# Patient Record
Sex: MALE | Race: WHITE | NOT HISPANIC OR LATINO | Employment: OTHER | URBAN - METROPOLITAN AREA
[De-identification: names, ages, dates, MRNs, and addresses within clinical notes are randomized per-mention and may not be internally consistent; named-entity substitution may affect disease eponyms.]

---

## 2018-04-27 PROBLEM — Z12.11 COLON CANCER SCREENING: Status: RESOLVED | Noted: 2018-04-27 | Resolved: 2018-04-27

## 2018-04-27 PROBLEM — Z12.11 COLON CANCER SCREENING: Status: ACTIVE | Noted: 2018-04-27

## 2023-11-10 ENCOUNTER — HOSPITAL ENCOUNTER (EMERGENCY)
Facility: HOSPITAL | Age: 63
Discharge: HOME | End: 2023-11-10
Attending: EMERGENCY MEDICINE

## 2023-11-10 VITALS
WEIGHT: 187 LBS | SYSTOLIC BLOOD PRESSURE: 132 MMHG | HEART RATE: 96 BPM | RESPIRATION RATE: 18 BRPM | TEMPERATURE: 98 F | DIASTOLIC BLOOD PRESSURE: 77 MMHG | BODY MASS INDEX: 27.62 KG/M2 | OXYGEN SATURATION: 95 %

## 2023-11-10 DIAGNOSIS — S61.512A LACERATION OF LEFT WRIST: ICD-10-CM

## 2023-11-10 PROCEDURE — 99283 EMERGENCY DEPT VISIT LOW MDM: CPT | Mod: 25

## 2023-11-10 PROCEDURE — 25000003 PHARM REV CODE 250

## 2023-11-10 PROCEDURE — 12002 RPR S/N/AX/GEN/TRNK2.6-7.5CM: CPT

## 2023-11-10 RX ORDER — LIDOCAINE HYDROCHLORIDE 10 MG/ML
10 INJECTION, SOLUTION EPIDURAL; INFILTRATION; INTRACAUDAL; PERINEURAL
Status: COMPLETED | OUTPATIENT
Start: 2023-11-10 | End: 2023-11-10

## 2023-11-10 RX ADMIN — LIDOCAINE HYDROCHLORIDE 100 MG: 10 INJECTION, SOLUTION EPIDURAL; INFILTRATION; INTRACAUDAL at 12:11

## 2023-11-10 NOTE — ED TRIAGE NOTES
Sustained laceration to left forearm with miter saw just PTA. Presents with dirty dressing around wound. Full distal sensation and movement noted. No distress.

## 2023-11-10 NOTE — DISCHARGE INSTRUCTIONS
You have 5 stitches   Keep sutures/staples and wound clean and dry.  Avoid submersion underwater; use soap, clean water, and gentle scrubbing to clean area.  Apply a new sterile bandage when existing bandage becomes dirty or saturated.  Monitor the wound regularly for any evidence of infection, including redness, drainage, increasing pain, or fever.   Follow-up with your PCP or return to ER as directed for wound re-check and suture/staple removal in 7-10 days.

## 2023-11-10 NOTE — ED PROVIDER NOTES
Encounter Date: 11/10/2023       History     Chief Complaint   Patient presents with    Laceration     Pt cut left forearm using micro-saw. Bleeding controlled in triage. Up to date on tetanus.      64 yo male with PMHx chronic back pain, GERD, Hep C presents to the ED with radiation to dorsal left wrist.  He reports he was cutting wood with a micro saw when the blade slipped.  He denies any numbness or tingling.  He is FROM about his hand wrist fingers.  He reports his tetanus did earlier this year.  He did not clean the wound after injury, but applied a pressure dressing. No DM.    The history is provided by the patient.     Review of patient's allergies indicates:  No Known Allergies  Past Medical History:   Diagnosis Date    Chronic back pain     GERD (gastroesophageal reflux disease)     Hepatitis C      Past Surgical History:   Procedure Laterality Date    ABDOMINAL ADHESION SURGERY      APPENDECTOMY      COLONOSCOPY N/A 4/27/2018    Procedure: COLONOSCOPY WITH POLYPECTOMY;  Surgeon: Duane Navarro MD;  Location: [facility];  Service: Endoscopy;  Laterality: N/A;     No family history on file.  Social History     Tobacco Use    Smoking status: Every Day     Current packs/day: 1.00     Types: Cigarettes   Substance Use Topics    Alcohol use: No    Drug use: No     Review of Systems   Constitutional:  Negative for chills and fever.   Musculoskeletal:  Negative for joint swelling.   Skin:  Positive for wound.   Psychiatric/Behavioral:  Negative for agitation and confusion.        Physical Exam     Initial Vitals [11/10/23 1129]   BP Pulse Resp Temp SpO2   132/77 96 18 98.1 °F (36.7 °C) 95 %      MAP       --         Physical Exam    Nursing note and vitals reviewed.  Constitutional: He appears well-developed and well-nourished. He is not diaphoretic.  Non-toxic appearance. No distress.   HENT:   Head: Normocephalic and atraumatic.   Right Ear: External ear normal.   Left Ear: External ear normal.   Eyes: EOM are  normal.   Neck: Neck supple.   Normal range of motion.  Cardiovascular:  Normal rate.           Pulmonary/Chest: No respiratory distress.   Abdominal: He exhibits no distension.   Musculoskeletal:         General: Normal range of motion.      Cervical back: Normal range of motion and neck supple.      Comments: 4 cm linear laceration to dorsal aspect of left wrist, some disruption of fascia. .5 cm width. No visible tendon, vascular, nerve injury.  Wound explored through full range of motion.  No visible contamination.  Patient has full range of motion about hand and wrist.  Sensation intact throughout.  Brisk capillary refill.     Neurological: He is alert and oriented to person, place, and time. GCS score is 15. GCS eye subscore is 4. GCS verbal subscore is 5. GCS motor subscore is 6.   Skin: Skin is dry.   Psychiatric: He has a normal mood and affect. His behavior is normal. Judgment and thought content normal.         ED Course   Lac Repair    Date/Time: 11/10/2023 2:33 PM    Performed by: Linda Simmons PA-C  Authorized by: Britany Ernst MD    Consent:     Consent obtained:  Verbal    Consent given by:  Patient  Universal protocol:     Patient identity confirmed:  Verbally with patient  Anesthesia:     Anesthesia method:  Local infiltration    Local anesthetic:  Lidocaine 1% w/o epi  Laceration details:     Location:  Shoulder/arm    Shoulder/arm location:  L lower arm    Length (cm):  4  Pre-procedure details:     Preparation:  Patient was prepped and draped in usual sterile fashion  Exploration:     Wound exploration: wound explored through full range of motion      Wound extent: areolar tissue violated and fascia violated      Wound extent: no foreign bodies/material noted, no muscle damage noted, no nerve damage noted, no tendon damage noted, no underlying fracture noted and no vascular damage noted    Treatment:     Area cleansed with:  Povidone-iodine    Amount of cleaning:  Standard     Irrigation solution:  Sterile saline and tap water  Skin repair:     Repair method:  Sutures    Suture size:  4-0    Suture material:  Nylon    Suture technique:  Simple interrupted    Number of sutures:  5  Repair type:     Repair type:  Simple  Post-procedure details:     Dressing:  Non-adherent dressing    Procedure completion:  Tolerated well, no immediate complications    Labs Reviewed - No data to display       Imaging Results              X-Ray Wrist Complete Left (Final result)  Result time 11/10/23 12:55:02      Final result by Redd Marc DO (11/10/23 12:55:02)                   Impression:      Soft tissue laceration as above.  No acute fracture or dislocation or radiopaque foreign body.      Electronically signed by: Redd Marc  Date:    11/10/2023  Time:    12:55               Narrative:    EXAMINATION:  XR WRIST COMPLETE 3 VIEWS LEFT    CLINICAL HISTORY:  Laceration without foreign body of left wrist, initial encounter    TECHNIQUE:  PA, lateral, and oblique views of the left wrist were performed.    COMPARISON:  None    FINDINGS:  There is a soft tissue laceration of the dorsal aspect of the wrist or distal forearm.  There is no evidence of a radiopaque foreign body in the field of view.  There is no acute fracture or dislocation.  There are nonspecific cysts in the ulnar styloid process.  There is a small well corticated osseous density just proximal to the distal radioulnar joint.  There is joint space narrowing of the triscaphe joint and the base of the thumb.                                       Medications   LIDOcaine (PF) 10 mg/ml (1%) injection 100 mg (100 mg Infiltration Given 11/10/23 1215)     Medical Decision Making  62 yo male with laceration to dorsal left wrist. 4 cm linear. NVI. No visible tendon injury. FROM. XR ordered to r/o metallic foreign body. No visible contamination.     Laceration, abrasion, nerve injury, vascular injury, tendon injury, fracture, open fracture    4  cm linear laceration sustained prior to arrival to dorsal left wrist. Tetanus UTD. Wound copiously irrigated and cleansed.  Entire depth of wound visualized and is without visible tendon, nerve, vascular injury.  X-ray without evidence of radiopaque foreign body or underlying fracture.  Skin prepped with iodine, laceration repair performed without complication. 5 sutures placed. Keep sutures/staples and wound clean and dry. Avoid submersion underwater; use soap, clean water, and gentle scrubbing to clean area. Apply a new sterile bandage when existing bandage becomes dirty or saturated. Monitor the wound regularly for any evidence of infection, including redness, drainage, increasing pain, or fever. Follow-up with your PCP or return to ER as directed for wound re-check and suture/staple removal in 7-10 days.      Amount and/or Complexity of Data Reviewed  Radiology: ordered. Decision-making details documented in ED Course.    Risk  Prescription drug management.               ED Course as of 11/10/23 1434   Fri Nov 10, 2023   1203 Patient reports tetanus UTD [CS]   1257 X-Ray Wrist Complete Left  Soft tissue laceration as above.  No acute fracture or dislocation or radiopaque foreign body. [CS]      ED Course User Index  [CS] Linda Simmons PA-C                    Clinical Impression:   Final diagnoses:  [S61.512A] Laceration of left wrist        ED Disposition Condition    Discharge Stable          ED Prescriptions    None       Follow-up Information       Follow up With Specialties Details Why Contact Info    Primary Care Provider  Schedule an appointment as soon as possible for a visit in 10 days               Linda Simmons PA-C  11/10/23 1434

## 2024-06-11 ENCOUNTER — HOSPITAL ENCOUNTER (EMERGENCY)
Facility: HOSPITAL | Age: 64
Discharge: HOME | End: 2024-06-11
Attending: EMERGENCY MEDICINE
Payer: COMMERCIAL

## 2024-06-11 VITALS
OXYGEN SATURATION: 100 % | RESPIRATION RATE: 18 BRPM | SYSTOLIC BLOOD PRESSURE: 153 MMHG | BODY MASS INDEX: 27.7 KG/M2 | DIASTOLIC BLOOD PRESSURE: 84 MMHG | TEMPERATURE: 99 F | HEIGHT: 69 IN | HEART RATE: 70 BPM | WEIGHT: 187 LBS

## 2024-06-11 DIAGNOSIS — M54.50 ACUTE RIGHT-SIDED LOW BACK PAIN, UNSPECIFIED WHETHER SCIATICA PRESENT: Primary | ICD-10-CM

## 2024-06-11 LAB
BILIRUB UR QL STRIP: NEGATIVE
CLARITY UR: CLEAR
COLOR UR: YELLOW
GLUCOSE UR QL STRIP: NEGATIVE
HGB UR QL STRIP: NEGATIVE
KETONES UR QL STRIP: NEGATIVE
LEUKOCYTE ESTERASE UR QL STRIP: NEGATIVE
NITRITE UR QL STRIP: NEGATIVE
PH UR STRIP: 7 [PH] (ref 5–8)
PROT UR QL STRIP: NEGATIVE
SP GR UR STRIP: 1.03 (ref 1–1.03)
URN SPEC COLLECT METH UR: ABNORMAL
UROBILINOGEN UR STRIP-ACNC: ABNORMAL EU/DL

## 2024-06-11 PROCEDURE — 63600175 PHARM REV CODE 636 W HCPCS: Performed by: PHYSICIAN ASSISTANT

## 2024-06-11 PROCEDURE — 81003 URINALYSIS AUTO W/O SCOPE: CPT | Performed by: PHYSICIAN ASSISTANT

## 2024-06-11 PROCEDURE — 99284 EMERGENCY DEPT VISIT MOD MDM: CPT | Mod: 25

## 2024-06-11 PROCEDURE — 96372 THER/PROPH/DIAG INJ SC/IM: CPT | Performed by: PHYSICIAN ASSISTANT

## 2024-06-11 RX ORDER — METHOCARBAMOL 500 MG/1
500 TABLET, FILM COATED ORAL 3 TIMES DAILY
Qty: 15 TABLET | Refills: 0 | Status: SHIPPED | OUTPATIENT
Start: 2024-06-11 | End: 2024-06-16

## 2024-06-11 RX ORDER — NAPROXEN 500 MG/1
500 TABLET ORAL 2 TIMES DAILY WITH MEALS
Qty: 30 TABLET | Refills: 0 | Status: SHIPPED | OUTPATIENT
Start: 2024-06-11

## 2024-06-11 RX ORDER — KETOROLAC TROMETHAMINE 30 MG/ML
15 INJECTION, SOLUTION INTRAMUSCULAR; INTRAVENOUS
Status: COMPLETED | OUTPATIENT
Start: 2024-06-11 | End: 2024-06-11

## 2024-06-11 RX ORDER — LIDOCAINE 50 MG/G
1 PATCH TOPICAL DAILY
Qty: 5 PATCH | Refills: 0 | Status: SHIPPED | OUTPATIENT
Start: 2024-06-11

## 2024-06-11 RX ORDER — DEXAMETHASONE SODIUM PHOSPHATE 4 MG/ML
8 INJECTION, SOLUTION INTRA-ARTICULAR; INTRALESIONAL; INTRAMUSCULAR; INTRAVENOUS; SOFT TISSUE
Status: COMPLETED | OUTPATIENT
Start: 2024-06-11 | End: 2024-06-11

## 2024-06-11 RX ADMIN — KETOROLAC TROMETHAMINE 15 MG: 30 INJECTION, SOLUTION INTRAMUSCULAR; INTRAVENOUS at 03:06

## 2024-06-11 RX ADMIN — DEXAMETHASONE SODIUM PHOSPHATE 8 MG: 4 INJECTION, SOLUTION INTRA-ARTICULAR; INTRALESIONAL; INTRAMUSCULAR; INTRAVENOUS; SOFT TISSUE at 03:06

## 2024-06-11 NOTE — ED PROVIDER NOTES
Encounter Date: 6/11/2024       History     Chief Complaint   Patient presents with    Back Pain     Lower back pain x 1 week. No reports of trauma. States pain on right side of back with pain shooting down left leg.     63-year-old male, PMH chronic back pain, presents to ED with concern of exacerbation of his lower back pain roughly 1 week ago.  Denies any specific injury or trauma.  Pain symptoms do worse with touch and movement are predominantly on right side but with intermittent pain radiating into left lower extremity.  No urinary or bowel incontinence, changes in urine color or frequency but he does mention mild burning with urination 2-3 days ago that has since resolved.  No abdominal or flank pain, fevers, chills, nausea or vomiting.  No numbness or focal weakness.  No other acute complaints at this time.    The history is provided by the patient.     Review of patient's allergies indicates:  No Known Allergies  Past Medical History:   Diagnosis Date    Chronic back pain     GERD (gastroesophageal reflux disease)     Hepatitis C      Past Surgical History:   Procedure Laterality Date    ABDOMINAL ADHESION SURGERY      APPENDECTOMY      COLONOSCOPY N/A 4/27/2018    Procedure: COLONOSCOPY WITH POLYPECTOMY;  Surgeon: Duane Navarro MD;  Location: [facility];  Service: Endoscopy;  Laterality: N/A;     No family history on file.  Social History     Tobacco Use    Smoking status: Every Day     Current packs/day: 1.00     Types: Cigarettes   Substance Use Topics    Alcohol use: No    Drug use: No     Review of Systems   Constitutional:  Negative for chills and fever.   Gastrointestinal:  Negative for abdominal pain, diarrhea, nausea and vomiting.   Genitourinary:  Positive for dysuria (resolved).   Musculoskeletal:  Positive for back pain. Negative for neck pain and neck stiffness.   Neurological:  Negative for weakness and numbness.       Physical Exam     Initial Vitals [06/11/24 1328]   BP Pulse Resp Temp SpO2    (!) 153/84 73 18 98.3 °F (36.8 °C) 100 %      MAP       --         Physical Exam    Vitals reviewed.  Constitutional: He appears well-developed and well-nourished. He is active. He does not have a sickly appearance. He does not appear ill. No distress.   HENT:   Head: Normocephalic and atraumatic.   Neck:   Normal range of motion.  Musculoskeletal:      Cervical back: Normal range of motion.      Comments: Reproducible tenderness to right-sided lower lumbar paraspinal region.  No midline bony tenderness with no bony palpable step-offs.  Also noted have tenderness near right SI joint.  Appropriate sensation and strength into BLE.  Positive straight leg raise on right.  Ambulatory in ED without assistance     Neurological: He is alert. GCS eye subscore is 4. GCS verbal subscore is 5. GCS motor subscore is 6.   Skin: Skin is warm and dry.   Psychiatric: He has a normal mood and affect. His speech is normal and behavior is normal.         ED Course   Procedures  Labs Reviewed   URINALYSIS, REFLEX TO URINE CULTURE - Abnormal; Notable for the following components:       Result Value    Urobilinogen, UA 4.0-6.0 (*)     All other components within normal limits    Narrative:     Specimen Source->Urine          Imaging Results    None          Medications   ketorolac injection 15 mg (15 mg Intramuscular Given 6/11/24 1516)   dexAMETHasone injection 8 mg (8 mg Intramuscular Given 6/11/24 1515)     Medical Decision Making  Patient presents with concern of exacerbation of his chronic lower back pain that began 1 week ago.  No associated injury or trauma.  No urinary or bowel incontinence.  Afebrile.  Reproducible tenderness to right-sided lower lumbar paraspinal region.  No neurological deficits noted.    DDx:  Including but not limited to strain, sprain, spasm, radiculopathy, neuropathy, arthritis, DDD, less likely cauda equina    Amount and/or Complexity of Data Reviewed  Labs:  Decision-making details documented in ED  Course.    Risk  Prescription drug management.               ED Course as of 06/11/24 1551   Tue Jun 11, 2024   1529 Urinalysis, Reflex to Urine Culture Urine, Clean Catch(!)  Unremarkable.  No hematuria. [KS]   1548 Patient was reassessed, reporting significant improvement of his symptoms following ED intervention.  Will continue with supportive care for suspected musculoskeletal pain.  Prescriptions as written below.  Encouraged ice/heat, stretches and movements as tolerated, outpatient follow-up.  ED return precautions were discussed.  Patient states his understanding and agrees with plan. [KS]      ED Course User Index  [KS] Fuentes Talbert PA-C                           Clinical Impression:  Final diagnoses:  [M54.50] Acute right-sided low back pain, unspecified whether sciatica present (Primary)          ED Disposition Condition    Discharge Stable          ED Prescriptions       Medication Sig Dispense Start Date End Date Auth. Provider    methocarbamoL (ROBAXIN) 500 MG Tab Take 1 tablet (500 mg total) by mouth 3 (three) times daily. for 5 days 15 tablet 6/11/2024 6/16/2024 Fuentes Talbert PA-C    LIDOcaine (LIDODERM) 5 % Place 1 patch onto the skin once daily. Remove & Discard patch within 12 hours or as directed by MD 5 patch 6/11/2024 -- Fuentes Talbert PA-C    naproxen (NAPROSYN) 500 MG tablet Take 1 tablet (500 mg total) by mouth 2 (two) times daily with meals. 30 tablet 6/11/2024 -- Fuentes Talbert PA-C          Follow-up Information       Follow up With Specialties Details Why Contact Info    Gabino Collins MD Family Medicine   [address]  [place] [id number] [phone]               Fuentes Talbert PA-C  06/11/24 7951

## 2024-06-11 NOTE — ED TRIAGE NOTES
Reports right flank pain with radiation down left leg x 1 week. States h/o sciatica with JACIEL in past. States pain is worse when laying flat, unable to rest at night. Adds that he is also having dysuria. No fever. No N/V. Taking BC powder with minimal relief. Presents awake, alert.

## 2024-06-11 NOTE — ED NOTES
Pt presents to ED with C/O lower back pain with onset x1 week. Pt states the pain radiated down the L leg. Pt states the pain is worse at night. Pt also C/P pain and burning with urination. Pt states he has been taking BC powder with no relief.

## 2024-06-11 NOTE — DISCHARGE INSTRUCTIONS

## 2025-02-06 ENCOUNTER — HOSPITAL ENCOUNTER (EMERGENCY)
Facility: HOSPITAL | Age: 65
Discharge: HOME | End: 2025-02-06
Attending: EMERGENCY MEDICINE
Payer: COMMERCIAL

## 2025-02-06 VITALS
RESPIRATION RATE: 18 BRPM | HEART RATE: 75 BPM | BODY MASS INDEX: 28.14 KG/M2 | SYSTOLIC BLOOD PRESSURE: 145 MMHG | TEMPERATURE: 98 F | HEIGHT: 69 IN | DIASTOLIC BLOOD PRESSURE: 82 MMHG | OXYGEN SATURATION: 96 % | WEIGHT: 190 LBS

## 2025-02-06 DIAGNOSIS — M10.9 ACUTE GOUT OF LEFT KNEE, UNSPECIFIED CAUSE: Primary | ICD-10-CM

## 2025-02-06 PROCEDURE — 63600175 PHARM REV CODE 636 W HCPCS: Mod: JZ,TB

## 2025-02-06 PROCEDURE — 99284 EMERGENCY DEPT VISIT MOD MDM: CPT | Mod: 25

## 2025-02-06 PROCEDURE — 96372 THER/PROPH/DIAG INJ SC/IM: CPT

## 2025-02-06 RX ORDER — INDOMETHACIN 50 MG/1
50 CAPSULE ORAL 3 TIMES DAILY
Qty: 20 CAPSULE | Refills: 0 | Status: SHIPPED | OUTPATIENT
Start: 2025-02-06

## 2025-02-06 RX ORDER — COLCHICINE 0.6 MG/1
0.6 CAPSULE ORAL DAILY
Qty: 8 CAPSULE | Refills: 0 | Status: SHIPPED | OUTPATIENT
Start: 2025-02-06 | End: 2025-02-14

## 2025-02-06 RX ORDER — KETOROLAC TROMETHAMINE 30 MG/ML
30 INJECTION, SOLUTION INTRAMUSCULAR; INTRAVENOUS
Status: COMPLETED | OUTPATIENT
Start: 2025-02-06 | End: 2025-02-06

## 2025-02-06 RX ORDER — PREDNISONE 20 MG/1
60 TABLET ORAL
Status: COMPLETED | OUTPATIENT
Start: 2025-02-06 | End: 2025-02-06

## 2025-02-06 RX ORDER — PREDNISONE 20 MG/1
40 TABLET ORAL DAILY
Qty: 10 TABLET | Refills: 0 | Status: SHIPPED | OUTPATIENT
Start: 2025-02-06 | End: 2025-02-11

## 2025-02-06 RX ADMIN — PREDNISONE 60 MG: 20 TABLET ORAL at 04:02

## 2025-02-06 RX ADMIN — KETOROLAC TROMETHAMINE 30 MG: 30 INJECTION, SOLUTION INTRAMUSCULAR; INTRAVENOUS at 04:02

## 2025-02-06 NOTE — DISCHARGE INSTRUCTIONS
Please take medications as prescribed to help with your gout flare.    Thank you for allowing me and my emergency team to take care of you here today! I hope you feel better soon. Please do not hesitate to return with any additional concerns that may arise from this or any new problem you encounter.    Our goal in the emergency department is to always give you outstanding care and exceptional service. If you receive a survey by mail or e-mail in the next week regarding your experience in our ED, we would greatly appreciate you completing it. Your feedback provides us with a way to recognize our staff who give very good care and it helps us learn how to improve when your experience was below the excellence we aspire to be!    Brook Juneau, PA-C [facility]   Emergency Room Physician Assistant

## 2025-02-06 NOTE — FIRST PROVIDER EVALUATION
Emergency Department TeleTriage Encounter Note      CHIEF COMPLAINT    Chief Complaint   Patient presents with    Knee Pain     Nontraumatic left knee pain that started ~Tuesday. Hx gout.        VITAL SIGNS   Initial Vitals [02/06/25 1453]   BP Pulse Resp Temp SpO2   (!) 145/82 75 18 98.4 °F (36.9 °C) 96 %      MAP       --            ALLERGIES    Review of patient's allergies indicates:  No Known Allergies    PROVIDER TRIAGE NOTE  Patient presents with pain in the left knee. No known injury. History of gout.       ORDERS  Labs Reviewed - No data to display    ED Orders (720h ago, onward)      None              Virtual Visit Note: The provider triage portion of this emergency department evaluation and documentation was performed via [employer], a HIPAA-compliant telemedicine application, in concert with a tele-presenter in the room. A face to face patient evaluation with one of my colleagues will occur once the patient is placed in an emergency department room.      DISCLAIMER: This note was prepared with [employer] voice recognition transcription software. Garbled syntax, mangled pronouns, and other bizarre constructions may be attributed to that software system.

## 2025-02-07 NOTE — ED PROVIDER NOTES
"Encounter Date: 2/6/2025       History     Chief Complaint   Patient presents with    Knee Pain     Nontraumatic left knee pain that started ~Tuesday. Hx gout.      Patient is a 64-year-old male with a past medical history of chronic back pain, GERD, and hepatitis-C who presents to emergency room for left knee pain.  Patient states that he has a history of gout.  Has not had a gout flare-up in "years." He started noticing pain around Tuesday.  Ate some shrimp yesterday, and pain worsened today.  No injury to the area.  Patient states that it is worse with bearing weight and flexion.  Denies weakness,, tingling, fever, body aches, chills, or others at this time.  No medications taken prior to arrival.    The history is provided by the patient. No  was used.     Review of patient's allergies indicates:  No Known Allergies  Past Medical History:   Diagnosis Date    Chronic back pain     GERD (gastroesophageal reflux disease)     Hepatitis C      Past Surgical History:   Procedure Laterality Date    ABDOMINAL ADHESION SURGERY      APPENDECTOMY      COLONOSCOPY N/A 4/27/2018    Procedure: COLONOSCOPY WITH POLYPECTOMY;  Surgeon: Duane Navarro MD;  Location: [facility];  Service: Endoscopy;  Laterality: N/A;     No family history on file.  Social History     Tobacco Use    Smoking status: Every Day     Current packs/day: 1.00     Types: Cigarettes   Substance Use Topics    Alcohol use: No    Drug use: No     Review of Systems   Constitutional:  Negative for chills, diaphoresis, fatigue and fever.   Musculoskeletal:  Positive for arthralgias (left knee). Negative for joint swelling and myalgias.   Skin:  Negative for color change, rash and wound.   Neurological:  Negative for weakness and numbness.       Physical Exam     Initial Vitals [02/06/25 1453]   BP Pulse Resp Temp SpO2   (!) 145/82 75 18 98.4 °F (36.9 °C) 96 %      MAP       --         Physical Exam    Nursing note and vitals " reviewed.  Constitutional: He appears well-developed and well-nourished. He is not diaphoretic. No distress.   Patient well-appearing.  Awake and alert.  No acute distress.  Maintaining airway appropriately.  Speaking in complete sentences.   HENT:   Head: Normocephalic and atraumatic.   Right Ear: External ear normal.   Left Ear: External ear normal.   Eyes: Conjunctivae and EOM are normal.   Neck: Neck supple.   Normal range of motion.  Pulmonary/Chest: No respiratory distress.   Musculoskeletal:         General: No edema.      Cervical back: Normal range of motion and neck supple.      Left knee: Swelling present. Decreased range of motion (due to pain). Tenderness present.        Legs:       Comments: Otherwise no lower extremity edema.  No overlying wounds.     Neurological: He is alert and oriented to person, place, and time. He has normal strength.   Skin: Skin is warm. Capillary refill takes less than 2 seconds.   Psychiatric: He has a normal mood and affect. His behavior is normal. Thought content normal.         ED Course   Procedures  Labs Reviewed - No data to display       Imaging Results    None          Medications   ketorolac injection 30 mg (30 mg Intramuscular Given 2/6/25 1607)   predniSONE tablet 60 mg (60 mg Oral Given 2/6/25 1608)     Medical Decision Making  Patient presents to emergency room for left knee pain.  Vital signs stable.  Physical exam as stated above.    Differential Diagnosis includes, but is not limited to fracture, dislocation, nerve injury/palsy, vascular injury, DVT, septic joint, cellulitis, bursitis, muscle strain, ligament tear/sprain, laceration, foreign body, abrasion, soft tissue contusion, osteoarthritis, or gout.  I do not suspect nerve or vascular injury, as sensation and pulses intact.  No significant extremity edema that would suggest DVT.  Patient with adequate range of motion.  Unlikely septic joint.  No evidence of laceration or abrasion on physical exam.  Clinical presentation and physical exam most suggestive of acute gout flare.  Patient given Toradol and prednisone in the emergency room.  Will prescribe indomethacin, colchicine, and prednisone to use upon discharge.  Discussed conservative management such as RICE therapy in addition to stretching.  Advised on refraining from alcohol and seafood to reduced gout flare-ups.    I see no indication of an emergent process beyond that addressed during our encounter. Patient stable for discharge at this time. I have counseled the patient regarding follow up with PCP and gave strict return precautions. I have discussed the final diagnosis and gave instructions regarding prescribed medications. Patient verbalized understanding and is agreeable.     Problems Addressed:  Acute gout of left knee, unspecified cause: acute illness or injury    Amount and/or Complexity of Data Reviewed  External Data Reviewed: notes.     Details: It appears patient last saw primary care in 2015.  Radiology:      Details: Considered ordering x-ray.  However, patient without any blunt trauma.  Low suspicion for fracture or dislocation.    Risk  OTC drugs.  Prescription drug management.  Risk Details: Comorbidities taken into consideration during the patient's evaluation and treatment include chronic back pain, GERD, and hepatitis-C.    Social determinants of health taken into consideration during development of our treatment plan include difficulty in obtaining follow-up, obtaining medications, health literacy, access to healthy options for preventative/conservative management, and/or support systems due to, but not limited to, transportation limitations, socioeconomic status, and environmental factors.                                       Clinical Impression:  Final diagnoses:  [M10.9] Acute gout of left knee, unspecified cause (Primary)          ED Disposition Condition    Discharge Stable          ED Prescriptions       Medication Sig Dispense  Start Date End Date Auth. Provider    indomethacin (INDOCIN) 50 MG capsule Take 1 capsule (50 mg total) by mouth 3 (three) times daily. 20 capsule 2/6/2025 -- Haritha Mathis PA-C    colchicine (MITIGARE) 0.6 mg Cap Take 1 capsule (0.6 mg total) by mouth once daily. Take 2 capsules by mouth and the one an hour later. for 8 doses 8 capsule 2/6/2025 2/14/2025 Haritha Mathis PA-C    predniSONE (DELTASONE) 20 MG tablet Take 2 tablets (40 mg total) by mouth once daily. for 5 days 10 tablet 2/6/2025 2/11/2025 Haritha Mathis PA-C          Follow-up Information       Follow up With Specialties Details Why Contact Info    Gabino Collins MD Family Medicine   [address] [facility] [address] [phone]      [facility] Emergency Medicine Go to  If new or worsening symptoms occur [address] [place] [id number] [phone]          This note was partially created using [employer] Voice Recognition software. Typographical and content errors may occur with this process. While efforts are made to detect and correct such errors, in some cases errors will persist. For this reason, wording in this document should be considered in the proper context and not strictly verbatim.        Haritha Mathis PA-C  02/06/25 1953

## 2025-04-10 ENCOUNTER — HOSPITAL ENCOUNTER (EMERGENCY)
Facility: HOSPITAL | Age: 65
Discharge: HOME | End: 2025-04-10
Attending: EMERGENCY MEDICINE
Payer: COMMERCIAL

## 2025-04-10 VITALS
HEART RATE: 62 BPM | WEIGHT: 190 LBS | BODY MASS INDEX: 28.14 KG/M2 | SYSTOLIC BLOOD PRESSURE: 133 MMHG | TEMPERATURE: 98 F | HEIGHT: 69 IN | DIASTOLIC BLOOD PRESSURE: 85 MMHG | OXYGEN SATURATION: 97 % | RESPIRATION RATE: 20 BRPM

## 2025-04-10 DIAGNOSIS — M25.572 ACUTE LEFT ANKLE PAIN: Primary | ICD-10-CM

## 2025-04-10 DIAGNOSIS — M10.072 ACUTE IDIOPATHIC GOUT OF LEFT ANKLE: ICD-10-CM

## 2025-04-10 LAB
ABSOLUTE EOSINOPHIL (OHS): 0.29 K/UL
ABSOLUTE MONOCYTE (OHS): 0.89 K/UL (ref 0.3–1)
ABSOLUTE NEUTROPHIL COUNT (OHS): 4.51 K/UL (ref 1.8–7.7)
ALBUMIN SERPL BCP-MCNC: 3.7 G/DL (ref 3.5–5.2)
ALP SERPL-CCNC: 105 UNIT/L (ref 40–150)
ALT SERPL W/O P-5'-P-CCNC: 30 UNIT/L (ref 10–44)
ANION GAP (OHS): 8 MMOL/L (ref 8–16)
AST SERPL-CCNC: 24 UNIT/L (ref 11–45)
BASOPHILS # BLD AUTO: 0.06 K/UL
BASOPHILS NFR BLD AUTO: 0.8 %
BILIRUB SERPL-MCNC: 0.2 MG/DL (ref 0.1–1)
BUN SERPL-MCNC: 15 MG/DL (ref 8–23)
CALCIUM SERPL-MCNC: 8.8 MG/DL (ref 8.7–10.5)
CHLORIDE SERPL-SCNC: 110 MMOL/L (ref 95–110)
CO2 SERPL-SCNC: 23 MMOL/L (ref 23–29)
CREAT SERPL-MCNC: 0.7 MG/DL (ref 0.5–1.4)
ERYTHROCYTE [DISTWIDTH] IN BLOOD BY AUTOMATED COUNT: 13.7 % (ref 11.5–14.5)
GFR SERPLBLD CREATININE-BSD FMLA CKD-EPI: >60 ML/MIN/1.73/M2
GLUCOSE SERPL-MCNC: 96 MG/DL (ref 70–110)
HCT VFR BLD AUTO: 42.3 % (ref 40–54)
HGB BLD-MCNC: 14.4 GM/DL (ref 14–18)
IMM GRANULOCYTES # BLD AUTO: 0.02 K/UL (ref 0–0.04)
IMM GRANULOCYTES NFR BLD AUTO: 0.3 % (ref 0–0.5)
LYMPHOCYTES # BLD AUTO: 2.22 K/UL (ref 1–4.8)
MCH RBC QN AUTO: 29.9 PG (ref 27–31)
MCHC RBC AUTO-ENTMCNC: 34 G/DL (ref 32–36)
MCV RBC AUTO: 88 FL (ref 82–98)
NUCLEATED RBC (/100WBC) (OHS): 0 /100 WBC
PLATELET # BLD AUTO: 304 K/UL (ref 150–450)
PMV BLD AUTO: 9.6 FL (ref 9.2–12.9)
POTASSIUM SERPL-SCNC: 4.8 MMOL/L (ref 3.5–5.1)
PROT SERPL-MCNC: 7.3 GM/DL (ref 6–8.4)
RBC # BLD AUTO: 4.82 M/UL (ref 4.6–6.2)
RELATIVE EOSINOPHIL (OHS): 3.6 %
RELATIVE LYMPHOCYTE (OHS): 27.8 % (ref 18–48)
RELATIVE MONOCYTE (OHS): 11.1 % (ref 4–15)
RELATIVE NEUTROPHIL (OHS): 56.4 % (ref 38–73)
SODIUM SERPL-SCNC: 141 MMOL/L (ref 136–145)
URATE SERPL-MCNC: 8.2 MG/DL (ref 3.4–7)
WBC # BLD AUTO: 7.99 K/UL (ref 3.9–12.7)

## 2025-04-10 PROCEDURE — 84550 ASSAY OF BLOOD/URIC ACID: CPT | Performed by: EMERGENCY MEDICINE

## 2025-04-10 PROCEDURE — 85025 COMPLETE CBC W/AUTO DIFF WBC: CPT | Performed by: EMERGENCY MEDICINE

## 2025-04-10 PROCEDURE — 96374 THER/PROPH/DIAG INJ IV PUSH: CPT

## 2025-04-10 PROCEDURE — 99284 EMERGENCY DEPT VISIT MOD MDM: CPT | Mod: 25

## 2025-04-10 PROCEDURE — 82247 BILIRUBIN TOTAL: CPT | Performed by: EMERGENCY MEDICINE

## 2025-04-10 PROCEDURE — 63600175 PHARM REV CODE 636 W HCPCS: Performed by: EMERGENCY MEDICINE

## 2025-04-10 RX ORDER — PREDNISONE 20 MG/1
40 TABLET ORAL DAILY
Qty: 10 TABLET | Refills: 0 | Status: SHIPPED | OUTPATIENT
Start: 2025-04-10 | End: 2025-04-15

## 2025-04-10 RX ORDER — COLCHICINE 0.6 MG/1
0.6 CAPSULE ORAL
Qty: 2 CAPSULE | Refills: 1 | Status: SHIPPED | OUTPATIENT
Start: 2025-04-10 | End: 2025-04-11

## 2025-04-10 RX ORDER — INDOMETHACIN 50 MG/1
50 CAPSULE ORAL 3 TIMES DAILY
Qty: 20 CAPSULE | Refills: 0 | Status: SHIPPED | OUTPATIENT
Start: 2025-04-10

## 2025-04-10 RX ORDER — DEXAMETHASONE SODIUM PHOSPHATE 4 MG/ML
4 INJECTION, SOLUTION INTRA-ARTICULAR; INTRALESIONAL; INTRAMUSCULAR; INTRAVENOUS; SOFT TISSUE
Status: COMPLETED | OUTPATIENT
Start: 2025-04-10 | End: 2025-04-10

## 2025-04-10 RX ADMIN — DEXAMETHASONE SODIUM PHOSPHATE 4 MG: 4 INJECTION, SOLUTION INTRA-ARTICULAR; INTRALESIONAL; INTRAMUSCULAR; INTRAVENOUS; SOFT TISSUE at 08:04

## 2025-04-10 NOTE — ED PROVIDER NOTES
Encounter Date: 4/10/2025       History     Chief Complaint   Patient presents with    Ankle Pain     C/o nontraumatic L ankle pain starting last night. Hx gout. Denies taking anything for sx.     The patient is a 64-year-old male who came to the emergency department with pain to his left ankle.  He has no trauma to his ankle.  He has a history of gout and states that this feels like his typical gout flare.  He states he was previously on 3 different medications but he does not know the name of them.  The patient states he has no past medical history.      Review of patient's allergies indicates:  No Known Allergies  Past Medical History:   Diagnosis Date    Chronic back pain     GERD (gastroesophageal reflux disease)     Hepatitis C      Past Surgical History:   Procedure Laterality Date    ABDOMINAL ADHESION SURGERY      APPENDECTOMY      COLONOSCOPY N/A 4/27/2018    Procedure: COLONOSCOPY WITH POLYPECTOMY;  Surgeon: Duane Navarro MD;  Location: [facility];  Service: Endoscopy;  Laterality: N/A;     No family history on file.  Social History[1]  Review of Systems   All other systems reviewed and are negative.      Physical Exam     Initial Vitals   BP Pulse Resp Temp SpO2   04/10/25 0755 04/10/25 0755 04/10/25 0755 04/10/25 0756 04/10/25 0755   (!) 171/83 70 20 97.9 °F (36.6 °C) 98 %      MAP       --                Physical Exam    Nursing note and vitals reviewed.  Constitutional: He appears well-developed and well-nourished.   Musculoskeletal:         General: Edema (left lateral ankle with swelling, no erythema) present. Normal range of motion.     Neurological: He is alert and oriented to person, place, and time.   Skin: Skin is warm and dry.   Psychiatric: He has a normal mood and affect. His behavior is normal. Thought content normal.         ED Course   Procedures  Labs Reviewed   COMPREHENSIVE METABOLIC PANEL - Normal       Result Value    Sodium 141      Potassium 4.8      Chloride 110      CO2 23       Glucose 96      BUN 15      Creatinine 0.7      Calcium 8.8      Protein Total 7.3      Albumin 3.7      Bilirubin Total 0.2            AST 24      ALT 30      Anion Gap 8      eGFR >60     CBC WITH DIFFERENTIAL - Normal    WBC 7.99      RBC 4.82      HGB 14.4      HCT 42.3      MCV 88      MCH 29.9      MCHC 34.0      RDW 13.7      Platelet Count 304      MPV 9.6      Nucleated RBC 0      Neut % 56.4      Lymph % 27.8      Mono % 11.1      Eos % 3.6      Basophil % 0.8      Imm Grans % 0.3      Neut # 4.51      Lymph # 2.22      Mono # 0.89      Eos # 0.29      Baso # 0.06      Imm Grans # 0.02     CBC W/ AUTO DIFFERENTIAL    Narrative:     The following orders were created for panel order CBC auto differential.  Procedure                               Abnormality         Status                     ---------                               -----------         ------                     CBC with Differential[[id number]]       Normal              Final result                 Please view results for these tests on the individual orders.   URIC ACID          Imaging Results    None          Medications   dexAMETHasone injection 4 mg (4 mg Intravenous Given 4/10/25 2361)     Medical Decision Making  This includes, but it is not limited to: OA, RA, arthritis, septic joint, gout/pseudogout, ligament sprain or tear, tendonitis, tendon strain, trauma, fracture      MDM:  The patient is a 64-year-old male with left ankle pain and swelling.  He had similar pain and swelling recently involving his knee.  The presumed diagnosis was gout.  His uric acid remains pending at this time.  His creatinine is good so I will give him prescriptions for colchicine and indomethacin.  He was given a dose of Decadron in the emergency department.    Amount and/or Complexity of Data Reviewed  Labs: ordered. Decision-making details documented in ED Course.    Risk  Prescription drug management.               ED Course as of 04/10/25 8075    Thu Apr 10, 2025   1112 CBC auto differential [ST]   1112 Comprehensive metabolic panel [ST]   1113 Uric  Acid will not result today [ST]      ED Course User Index  [ST] Savita Marc MD                           Clinical Impression:  Final diagnoses:  [M25.572] Acute left ankle pain (Primary)  [M10.072] Acute idiopathic gout of left ankle          ED Disposition Condition    Discharge Stable          ED Prescriptions       Medication Sig Dispense Start Date End Date Auth. Provider    indomethacin (INDOCIN) 50 MG capsule Take 1 capsule (50 mg total) by mouth 3 (three) times daily. 20 capsule 4/10/2025 -- Savita Marc MD    colchicine (MITIGARE) 0.6 mg Cap Take 1 capsule (0.6 mg total) by mouth every hour. for 2 doses 2 capsule 4/10/2025 4/11/2025 Savita Marc MD    predniSONE (DELTASONE) 20 MG tablet Take 2 tablets (40 mg total) by mouth once daily. Start this tomorrow for 5 days 10 tablet 4/10/2025 4/15/2025 Savita Marc MD          Follow-up Information       Follow up With Specialties Details Why Contact Info    Gabino Collins MD Family Medicine Schedule an appointment as soon as possible for a visit   [address]  [place] [id number] [phone]                   [1]   Social History  Tobacco Use    Smoking status: Every Day     Current packs/day: 1.00     Types: Cigarettes   Substance Use Topics    Alcohol use: No    Drug use: No        Savita Marc MD  04/10/25 1115

## 2025-04-14 PROBLEM — B35.1 ONYCHOMYCOSIS: Status: ACTIVE | Noted: 2025-04-14

## 2025-04-14 PROBLEM — M10.9 GOUT: Status: ACTIVE | Noted: 2025-04-14

## 2025-04-14 PROBLEM — F17.200 TOBACCO USE DISORDER: Status: ACTIVE | Noted: 2025-04-14

## 2025-04-14 PROBLEM — B18.2 CHRONIC HEPATITIS C VIRUS INFECTION: Status: ACTIVE | Noted: 2025-04-14

## 2025-04-22 ENCOUNTER — TELEPHONE (OUTPATIENT)
Dept: HEPATOLOGY | Facility: CLINIC | Age: 65
End: 2025-04-22
Payer: COMMERCIAL

## 2025-04-22 NOTE — TELEPHONE ENCOUNTER
Dr. Chen Tilley ordered that patient be scheduled for a hepatology consult visit for hep c.  Patient hep c quant positive.  Attempt made to schedule patient for a visit with PA Scheuermann at [facility].  The patient has [employer] coverage.  They have no benefits for services at this location. The patient will have to pay out of pocket for this visit so consult visit not scheduled.  I spoke with patient and the above info relayed.  Referral info will be faxed to Olu Mckeon at [facility] so that patient can be scheduled there for a visit. This message is being sent to the referring provider.

## 2025-06-24 ENCOUNTER — HOSPITAL ENCOUNTER (EMERGENCY)
Facility: HOSPITAL | Age: 65
Discharge: HOME | End: 2025-06-24
Attending: EMERGENCY MEDICINE
Payer: COMMERCIAL

## 2025-06-24 VITALS
BODY MASS INDEX: 31.99 KG/M2 | TEMPERATURE: 98 F | SYSTOLIC BLOOD PRESSURE: 129 MMHG | WEIGHT: 192 LBS | DIASTOLIC BLOOD PRESSURE: 66 MMHG | OXYGEN SATURATION: 97 % | RESPIRATION RATE: 20 BRPM | HEART RATE: 77 BPM | HEIGHT: 65 IN

## 2025-06-24 DIAGNOSIS — M10.9 ACUTE GOUTY ARTHRITIS: Primary | ICD-10-CM

## 2025-06-24 DIAGNOSIS — M79.674 GREAT TOE PAIN, RIGHT: ICD-10-CM

## 2025-06-24 LAB — POCT GLUCOSE: 147 MG/DL (ref 70–110)

## 2025-06-24 PROCEDURE — 63600175 PHARM REV CODE 636 W HCPCS: Mod: JZ,TB | Performed by: EMERGENCY MEDICINE

## 2025-06-24 PROCEDURE — 99284 EMERGENCY DEPT VISIT MOD MDM: CPT | Mod: 25

## 2025-06-24 PROCEDURE — 96372 THER/PROPH/DIAG INJ SC/IM: CPT | Performed by: EMERGENCY MEDICINE

## 2025-06-24 PROCEDURE — 82962 GLUCOSE BLOOD TEST: CPT

## 2025-06-24 RX ORDER — IBUPROFEN 600 MG/1
600 TABLET, FILM COATED ORAL EVERY 6 HOURS PRN
Qty: 20 TABLET | Refills: 0 | Status: SHIPPED | OUTPATIENT
Start: 2025-06-24

## 2025-06-24 RX ORDER — DICLOFENAC SODIUM 10 MG/G
2 GEL TOPICAL 4 TIMES DAILY PRN
Qty: 200 G | Refills: 0 | Status: SHIPPED | OUTPATIENT
Start: 2025-06-24

## 2025-06-24 RX ORDER — DEXAMETHASONE SODIUM PHOSPHATE 4 MG/ML
12 INJECTION, SOLUTION INTRA-ARTICULAR; INTRALESIONAL; INTRAMUSCULAR; INTRAVENOUS; SOFT TISSUE
Status: COMPLETED | OUTPATIENT
Start: 2025-06-24 | End: 2025-06-24

## 2025-06-24 RX ORDER — KETOROLAC TROMETHAMINE 30 MG/ML
30 INJECTION, SOLUTION INTRAMUSCULAR; INTRAVENOUS
Status: COMPLETED | OUTPATIENT
Start: 2025-06-24 | End: 2025-06-24

## 2025-06-24 RX ORDER — PREDNISONE 20 MG/1
40 TABLET ORAL DAILY
Qty: 8 TABLET | Refills: 0 | Status: SHIPPED | OUTPATIENT
Start: 2025-06-25 | End: 2025-06-29

## 2025-06-24 RX ADMIN — KETOROLAC TROMETHAMINE 30 MG: 30 INJECTION, SOLUTION INTRAMUSCULAR; INTRAVENOUS at 09:06

## 2025-06-24 RX ADMIN — DEXAMETHASONE SODIUM PHOSPHATE 12 MG: 4 INJECTION, SOLUTION INTRA-ARTICULAR; INTRALESIONAL; INTRAMUSCULAR; INTRAVENOUS; SOFT TISSUE at 09:06

## 2025-06-24 NOTE — ED PROVIDER NOTES
Encounter Date: 6/24/2025       History     Chief Complaint   Patient presents with    Gout     Pt report gout flare up to R great toe x2 days. Denies recent trauma. Taking daily prescribed pain meds w/o relief.      HPI    64-year-old male with past medical history of GERD, hyperlipidemia, hypertension  presents with right great toe pain for the last 2 days.  Patient reports waking up 2 days ago with a swollen and painful right toe.  Patient reports no recent trauma, no falls, states he has been able to walk around without issue recently until 2 days ago when in his become very painful.  Reports a history of gout, states it has flared up in the past.  He did recently increase his allopurinol from 200-300 mg daily with his regular doctor.  He reports his last uric acid level was high.  He reports no recent seafood or dietary changes.  He denies any fevers or chills, denies any further complaints.    Review of patient's allergies indicates:  No Known Allergies  Past Medical History:   Diagnosis Date    Chronic back pain     GERD (gastroesophageal reflux disease)     Hepatitis C     Hyperlipidemia     Hypertension      Past Surgical History:   Procedure Laterality Date    ABDOMINAL ADHESION SURGERY      APPENDECTOMY      COLONOSCOPY N/A 4/27/2018    Procedure: COLONOSCOPY WITH POLYPECTOMY;  Surgeon: Duane Navarro MD;  Location: [facility];  Service: Endoscopy;  Laterality: N/A;     Family History   Problem Relation Name Age of Onset    Other (hepatitis c) Sister       Social History[1]  Review of Systems   Constitutional: Negative.    HENT: Negative.     Eyes: Negative.    Respiratory: Negative.     Cardiovascular: Negative.    Gastrointestinal: Negative.    Genitourinary: Negative.    Musculoskeletal:         Right toe pain   Skin: Negative.    Neurological: Negative.        Physical Exam     Initial Vitals [06/24/25 0851]   BP Pulse Resp Temp SpO2   129/66 77 20 98.4 °F (36.9 °C) 97 %      MAP       --          Physical Exam    Nursing note and vitals reviewed.  Constitutional: He appears well-developed and well-nourished. He is not diaphoretic. No distress.   HENT:   Head: Normocephalic and atraumatic.   Eyes: Pupils are equal, round, and reactive to light. Right eye exhibits no discharge. Left eye exhibits no discharge.   Neck: No tracheal deviation present.   Normal range of motion.  Cardiovascular:  Intact distal pulses.           Pulmonary/Chest: No stridor. No respiratory distress.   Musculoskeletal:         General: Tenderness and edema (Right  1st toe swelling with some associated tenderness and erythema noted, distal pulses intact.) present.      Cervical back: Normal range of motion.     Neurological: He is alert and oriented to person, place, and time.   Skin: Skin is warm and dry.         ED Course   Procedures  Labs Reviewed   POCT GLUCOSE - Abnormal       Result Value    POCT Glucose 147 (*)    POCT GLUCOSE MONITORING CONTINUOUS          Imaging Results    None          Medications   dexAMETHasone injection 12 mg (12 mg Intramuscular Given 6/24/25 0907)   ketorolac injection 30 mg (30 mg Intramuscular Given 6/24/25 0907)     Medical Decision Making  Risk  Prescription drug management.                    MDM:    64-year-old male with past medical history of GERD, hyperlipidemia, hypertension  presents with right great toe pain for the last 2 days. Differential Diagnosis includes:  Gout, osteomyelitis, fracture, sprain.  Physical exam as noted above.  ED workup notable for POCT glucose 147.   Patient presentation  appears more consistent with gouty arthropathy.  Will continue conservative management, continue his  allopurinol and follow up with his regular doctor outpatient.  Do not suspect any additional surgical or medical emergency. Discussed diagnosis and further treatment with patient, including f/u.  Return precautions given and all questions answered.  Patient in understanding of plan.  Pt discharged  to home improved and stable.        Note was created using voice recognition software. Note may have occasional typographical or grammatical errors, garbled syntax, and other bizarre constructions that may not have been identified and edited despite good mary initial review prior to signing.                           Clinical Impression:  Final diagnoses:  [M10.9] Acute gouty arthritis (Primary)  [M79.674] Great toe pain, right          ED Disposition Condition    Discharge Stable          ED Prescriptions       Medication Sig Dispense Start Date End Date Auth. Provider    ibuprofen (ADVIL,MOTRIN) 600 MG tablet Take 1 tablet (600 mg total) by mouth every 6 (six) hours as needed for Pain. 20 tablet 6/24/2025 -- Feliciano Lee MD    predniSONE (DELTASONE) 20 MG tablet Take 2 tablets (40 mg total) by mouth once daily. for 4 days 8 tablet 6/25/2025 6/29/2025 Feliciano Lee MD    diclofenac sodium (VOLTAREN ARTHRITIS PAIN) 1 % Gel Apply 2 g topically 4 (four) times daily as needed (pain). 200 g 6/24/2025 -- Feliciano Lee MD          Follow-up Information       Follow up With Specialties Details Why Contact Info    [facility] - Emergency Dept Emergency Medicine Go to  If symptoms worsen [address] [facility] [address] [phone]    Your Primary Care Physician  Schedule an appointment as soon as possible for a visit  As needed                  Feliciano Lee MD  06/24/25 9606         [1]   Social History  Tobacco Use    Smoking status: Every Day     Current packs/day: 0.50     Average packs/day: 0.5 packs/day for 50.2 years (25.1 ttl pk-yrs)     Types: Cigarettes     Start date: 4/14/1975   Vaping Use    Vaping status: Never Used   Substance Use Topics    Alcohol use: Not Currently    Drug use: No        Feliciano Lee MD  06/24/25 9868
